# Patient Record
(demographics unavailable — no encounter records)

---

## 2024-10-18 NOTE — PHYSICAL EXAM
[No Acute Distress] : no acute distress [Well Nourished] : well nourished [Well Developed] : well developed [Well-Appearing] : well-appearing [No Respiratory Distress] : no respiratory distress  [No Accessory Muscle Use] : no accessory muscle use [Urethral Meatus] : meatus normal [Penis Abnormality] : normal circumcised penis [Scrotum] : the scrotum was normal [Coordination Grossly Intact] : coordination grossly intact [No Focal Deficits] : no focal deficits [Normal Gait] : normal gait [Normal Affect] : the affect was normal [Normal Insight/Judgement] : insight and judgment were intact [de-identified] : left groin erythema

## 2024-10-18 NOTE — HISTORY OF PRESENT ILLNESS
[de-identified] : 34 year M with PMH asthma and GERD presents for referral to fertility specialist and complains of a groin rash. Pt denies CP/SOB, fever/chills, n/v/d/c.

## 2024-10-18 NOTE — PLAN
[FreeTextEntry1] : Gave referral information to patient for fertility specialist. Treat tinea corporis with OTC. Pt advised to sign up for [facility] portal to review labs and communicate any questions or concerns directly. Yearly physical and return as needed for illness, medication refills, and new or existing complaints.